# Patient Record
Sex: FEMALE | Race: BLACK OR AFRICAN AMERICAN | Employment: UNEMPLOYED | URBAN - METROPOLITAN AREA
[De-identification: names, ages, dates, MRNs, and addresses within clinical notes are randomized per-mention and may not be internally consistent; named-entity substitution may affect disease eponyms.]

---

## 2017-02-06 ENCOUNTER — HOSPITAL ENCOUNTER (EMERGENCY)
Age: 47
Discharge: HOME | End: 2017-02-06
Attending: EMERGENCY MEDICINE
Payer: SELF-PAY

## 2017-02-06 VITALS
TEMPERATURE: 98 F | RESPIRATION RATE: 16 BRPM | DIASTOLIC BLOOD PRESSURE: 97 MMHG | BODY MASS INDEX: 37.69 KG/M2 | SYSTOLIC BLOOD PRESSURE: 145 MMHG | WEIGHT: 192 LBS | HEART RATE: 77 BPM | OXYGEN SATURATION: 97 % | HEIGHT: 60 IN

## 2017-02-06 DIAGNOSIS — G62.9 NEUROPATHY: Primary | ICD-10-CM

## 2017-02-06 PROCEDURE — 99282 EMERGENCY DEPT VISIT SF MDM: CPT

## 2017-02-06 RX ORDER — GABAPENTIN 300 MG/1
300 CAPSULE ORAL 3 TIMES DAILY
Qty: 90 CAP | Refills: 0 | Status: SHIPPED | OUTPATIENT
Start: 2017-02-06 | End: 2021-04-19 | Stop reason: ALTCHOICE

## 2017-02-06 NOTE — ED PROVIDER NOTES
HPI Comments: 11:56 AM Lida Mgaallanes is a 48 y.o. female w/ hx of HA, and anemia who presents to the ED c/o L greater toe pain for 3 days. Pt states that the pain is radiating into her L calf. Pt has been soaking her toe in warm water and Epsom salt w/ no relief. Pt also c/o L arm pain. Pt is currently taking Ultram. Pt has a hx of tubal ligation, , gastric bypass. Pt has no other sx or complaints. The history is provided by the patient. Past Medical History:   Diagnosis Date    Anemia     Chronic pain     Osteoporosis        Past Surgical History:   Procedure Laterality Date    Hx gastric bypass      Hx hernia repair      Hx tubal ligation      Hx  section           Family History:   Problem Relation Age of Onset    Hypertension Mother     Heart Disease Sister 27     CHF       Social History     Social History    Marital status: SINGLE     Spouse name: N/A    Number of children: N/A    Years of education: N/A     Occupational History    Not on file. Social History Main Topics    Smoking status: Never Smoker    Smokeless tobacco: Not on file    Alcohol use Yes    Drug use: No    Sexual activity: Not on file     Other Topics Concern    Not on file     Social History Narrative         ALLERGIES: Review of patient's allergies indicates no known allergies. Review of Systems   Constitutional: Negative for chills, diaphoresis, fatigue, fever and unexpected weight change. HENT: Negative for congestion, dental problem, ear discharge, ear pain, hearing loss, nosebleeds, postnasal drip, sinus pressure, sore throat, trouble swallowing and voice change. Eyes: Negative for photophobia, pain, discharge, redness and visual disturbance. Respiratory: Negative for cough, chest tightness, shortness of breath, wheezing and stridor. Cardiovascular: Negative for chest pain, palpitations and leg swelling.    Gastrointestinal: Negative for abdominal distention, abdominal pain, anal bleeding, blood in stool, constipation, diarrhea, nausea and vomiting. Genitourinary: Negative for difficulty urinating, dyspareunia, dysuria, flank pain, frequency, genital sores, hematuria, menstrual problem, pelvic pain, urgency, vaginal bleeding, vaginal discharge and vaginal pain. Musculoskeletal: Positive for arthralgias (L great toe; L arm; L calf) and myalgias (L great toe; L arm; L calf). Negative for back pain, joint swelling, neck pain and neck stiffness. Skin: Negative for color change, rash and wound. Neurological: Negative for dizziness, tremors, seizures, syncope, weakness, light-headedness, numbness and headaches. Hematological: Negative for adenopathy. Does not bruise/bleed easily. Psychiatric/Behavioral: Negative for agitation, confusion, decreased concentration, hallucinations, sleep disturbance and suicidal ideas. The patient is not nervous/anxious. All other systems reviewed and are negative. Vitals:    02/06/17 1154   BP: (!) 145/97   Pulse: 77   Resp: 16   Temp: 98 °F (36.7 °C)   SpO2: 97%   Weight: 87.1 kg (192 lb)   Height: 5' (1.524 m)            Physical Exam   Constitutional: She is oriented to person, place, and time. She appears well-developed and well-nourished. No distress. HENT:   Head: Normocephalic and atraumatic. Right Ear: External ear normal.   Left Ear: External ear normal.   Nose: Nose normal.   Mouth/Throat: Oropharynx is clear and moist. No oropharyngeal exudate. Eyes: Conjunctivae and EOM are normal. Pupils are equal, round, and reactive to light. Right eye exhibits no discharge. Left eye exhibits no discharge. No scleral icterus. Neck: Normal range of motion. Neck supple. No JVD present. No tracheal deviation present. No thyromegaly present. Cardiovascular: Normal rate, regular rhythm, normal heart sounds and intact distal pulses. Exam reveals no gallop and no friction rub. No murmur heard.   Pulmonary/Chest: Effort normal and breath sounds normal. No stridor. No respiratory distress. She has no wheezes. She has no rales. She exhibits no tenderness. Abdominal: Soft. Bowel sounds are normal. She exhibits no distension and no mass. There is no tenderness. There is no rebound and no guarding. Musculoskeletal: Normal range of motion. She exhibits no edema or tenderness. Lymphadenopathy:     She has no cervical adenopathy. Neurological: She is alert and oriented to person, place, and time. She has normal reflexes. No cranial nerve deficit. Skin: Skin is warm and dry. No rash noted. She is not diaphoretic. No erythema. No pallor. Psychiatric: She has a normal mood and affect. Her behavior is normal. Judgment normal.   Nursing note and vitals reviewed. MDM  Number of Diagnoses or Management Options  Elevated blood pressure:   Neuropathy:   Risk of Complications, Morbidity, and/or Mortality  Presenting problems: low  Diagnostic procedures: minimal  Management options: low      ED Course       Procedures        Vitals:  Patient Vitals for the past 12 hrs:   Temp Pulse Resp BP SpO2   02/06/17 1154 98 °F (36.7 °C) 77 16 (!) 145/97 97 %       Medications ordered:   Medications - No data to display      Lab findings:    Progress notes, Consult notes or additional Procedure notes:       Disposition:  Diagnosis:   1. Neuropathy    2. Elevated blood pressure        Disposition: Discharge    Follow-up Information     Follow up With Details Comments [facility] Schedule an appointment as soon as possible for a visit in 1 day for re-evaluation and further treatment [address] EMERGENCY DEPT  As needed, If symptoms worsen [address] [facility] 76.  [phone]           Patient's Medications   Start Taking    GABAPENTIN (NEURONTIN) 300 MG CAPSULE    Take 1 Cap by mouth three (3) times daily.    Continue Taking    BUTALBITAL-ACETAMINOPHEN-CAFFEINE (FIORICET, ESGIC) -40 MG PER TABLET    Take 1 Tab by mouth. CHOLECALCIFEROL, VITAMIN D3, (VITAMIN D3) 2,000 UNIT TAB    Take  by mouth. TRAMADOL (ULTRAM) 50 MG TABLET    Take 1 Tab by mouth every six (6) hours as needed for Pain. Max Daily Amount: 200 mg. These Medications have changed    No medications on file   Stop Taking    FERROUS SULFATE 324 MG (65 MG IRON) TABLET    Take  by mouth Daily (before breakfast). Scribe Attestation:   I, Edilberto Gaytan am scribing for and in the presence of Ruy Martin DO on this day 02/06/17 at 11:56 AM   vanessa Holden    Provider Attestation:  I personally performed the services described in the documentation, reviewed the documentation, as recorded by the scribe in my presence, and it accurately and completely records my words and actions.   Ruy Martin DO. 11:56 AM      Signed by: Vanessa Holden, 11:56 AM

## 2017-02-06 NOTE — ED TRIAGE NOTES
\"I've had pain and swelling in my left big toe for two days. Today the pain was going up my leg and my arm and hand are hurting too. \"

## 2017-02-06 NOTE — DISCHARGE INSTRUCTIONS
Acute High Blood Pressure: Care Instructions  Your Care Instructions  Acute high blood pressure is very high blood pressure. It's a serious problem. Very high blood pressure can damage your brain, heart, eyes, and kidneys. You may have been given medicines to lower your blood pressure. You may have gotten them as pills or through a needle in one of your veins. This is called an IV. And maybe you were given other medicines too. These can be needed when high blood pressure causes other problems. To keep your blood pressure at a lower level, you may need to make healthy lifestyle changes. And you will probably need to take medicines. Be sure to follow up with your doctor about your blood pressure and what you can do about it. Follow-up care is a key part of your treatment and safety. Be sure to make and go to all appointments, and call your doctor if you are having problems. It's also a good idea to know your test results and keep a list of the medicines you take. How can you care for yourself at home? · See your doctor as often as he or she recommends. This is to make sure your blood pressure is under control. You will probably go at least 2 times a year. But it may be more often at first.  · Take your blood pressure medicine exactly as prescribed. You may take one or more types. They include diuretics, beta-blockers, ACE inhibitors, calcium channel blockers, and angiotensin II receptor blockers. Call your doctor if you think you are having a problem with your medicine. · If you take blood pressure medicine, talk to your doctor before you take decongestants or anti-inflammatory medicine, such as ibuprofen. These can raise blood pressure. · Learn how to check your blood pressure at home. Check it often. · Ask your doctor if it's okay to drink alcohol. · Talk to your doctor about lifestyle changes that can help blood pressure. These include being active and not smoking.   When should you call for help?  Call 911 anytime you think you may need emergency care. This may mean having symptoms that suggest that your blood pressure is causing a serious heart or blood vessel problem. Your blood pressure may be over 180/110. For example, call 911 if:  · You have symptoms of a heart attack. These may include:  ¨ Chest pain or pressure, or a strange feeling in the chest.  ¨ Sweating. ¨ Shortness of breath. ¨ Nausea or vomiting. ¨ Pain, pressure, or a strange feeling in the back, neck, jaw, or upper belly or in one or both shoulders or arms. ¨ Lightheadedness or sudden weakness. ¨ A fast or irregular heartbeat. · You have symptoms of a stroke. These may include:  ¨ Sudden numbness, tingling, weakness, or loss of movement in your face, arm, or leg, especially on only one side of your body. ¨ Sudden vision changes. ¨ Sudden trouble speaking. ¨ Sudden confusion or trouble understanding simple statements. ¨ Sudden problems with walking or balance. ¨ A sudden, severe headache that is different from past headaches. · You have severe back or belly pain. Do not wait until your blood pressure comes down on its own. Get help right away. Call your doctor now or seek immediate care if:  · Your blood pressure is much higher than normal (such as 180/110 or higher), but you don't have symptoms. · You think high blood pressure is causing symptoms, such as:  ¨ Severe headache. ¨ Blurry vision. Watch closely for changes in your health, and be sure to contact your doctor if:  · Your blood pressure measures 140/90 or higher at least 2 times. That means the top number is 140 or higher or the bottom number is 90 or higher, or both. · You think you may be having side effects from your blood pressure medicine. · Your blood pressure is usually normal, but it goes above normal at least 2 times. Where can you learn more? Go to http://bennie-lisa.info/.   Enter T562 in the search box to learn more about \"Acute High Blood Pressure: Care Instructions. \"  Current as of: August 8, 2016  Content Version: 11.1  © 6979-0155 [employer]. Care instructions adapted under license by [employer] (which disclaims liability or warranty for this information). If you have questions about a medical condition or this instruction, always ask your healthcare professional. [address] any warranty or liability for your use of this information. Neuropathic Pain: Care Instructions  Your Care Instructions  Neuropathic pain is caused by pressure on or damage to your nerves. It's often simply called nerve pain. Some people feel this type of pain all the time. For others, it comes and goes. Diabetes, shingles, or an injury can cause nerve pain. Many people say the pain feels sharp, burning, or stabbing. But some people feel it as a dull ache. In some cases, it makes your skin very sensitive. So touch, pressure, and other sensations that did not hurt before may now cause pain. It's important to know that this kind of pain is real and can affect your quality of life. It's also important to know that treatment can help. Treatment includes pain medicines, exercise, and physical therapy. Medicines can help reduce the number of pain signals that travel over the nerves. This can make the painful areas less sensitive. It can also help you sleep better and improve your mood. But medicines are only one part of successful treatment. Most people do best with more than one kind of treatment. Your doctor may recommend that you try cognitive-behavioral therapy and stress management. Or, if needed, you may decide to try to quit smoking, lower your blood pressure, or better control blood sugar. These kinds of healthy changes can also make a difference. If you feel that your treatment is not working, talk to your doctor. And be sure to tell your doctor if you think you might be depressed or anxious. These are common problems that can also be treated. Follow-up care is a key part of your treatment and safety. Be sure to make and go to all appointments, and call your doctor if you are having problems. It's also a good idea to know your test results and keep a list of the medicines you take. How can you care for yourself at home? · Be safe with medicines. Read and follow all instructions on the label. ¨ If the doctor gave you a prescription medicine for pain, take it as prescribed. ¨ If you are not taking a prescription pain medicine, ask your doctor if you can take an over-the-counter medicine. · Save hard tasks for days when you have less pain. Follow a hard task with an easy task. And remember to take breaks. · Relax, and reduce stress. You may want to try deep breathing or meditation. These can help. · Keep moving. Gentle, daily exercise can help reduce pain. Your doctor or physical therapist can tell you what type of exercise is best for you. This may include walking, swimming, and stationary biking. It may also include stretches and range-of-motion exercises. · Try heat, cold packs, and massage. · Get enough sleep. Constant pain can make you more tired. If the pain makes it hard to sleep, talk with your doctor. · Think positively. Your thoughts can affect your pain. Do fun things to distract yourself from the pain. See a movie, read a book, listen to music, or spend time with a friend. · Keep a pain diary. Try to write down how strong your pain is and what it feels like. Also try to notice and write down how your moods, thoughts, sleep, activities, and medicine affect your pain. These notes can help you and your doctor find the best ways to treat your pain. Reducing constipation caused by pain medicine  Pain medicines often cause constipation. To reduce constipation:  · Include fruits, vegetables, beans, and whole grains in your diet each day. These foods are high in fiber.   · Drink plenty of fluids, enough so that your urine is light yellow or clear like water. If you have kidney, heart, or liver disease and have to limit fluids, talk with your doctor before you increase the amount of fluids you drink. · Get some exercise every day. Build up slowly to 30 to 60 minutes a day on 5 or more days of the week. · Take a fiber supplement, such as Citrucel or Metamucil, every day if needed. Read and follow all instructions on the label. · Schedule time each day for a bowel movement. Having a daily routine may help. Take your time and do not strain when having a bowel movement. · Ask your doctor about a laxative. The goal is to have one easy bowel movement every 1 to 2 days. Do not let constipation go untreated for more than 3 days. When should you call for help? Call your doctor now or seek immediate medical care if:  · You feel sad, anxious, or hopeless for more than a few days. This could mean you are depressed. Depression is common in people who have a lot of pain. But it can be treated. · You have trouble with bowel movements, such as:  ¨ No bowel movement in 3 days. ¨ Blood in the anal area, in your stool, or on the toilet paper. ¨ Diarrhea for more than 24 hours. Watch closely for changes in your health, and be sure to contact your doctor if:  · Your pain is getting worse. · You can't sleep because of pain. · You are very worried or anxious about your pain. · You have trouble taking your pain medicine. · You have any concerns about your pain medicine or its side effects. · You have vomiting or cramps for more than 2 hours. Where can you learn more? Go to http://bennie-lisa.info/. Enter O360 in the search box to learn more about \"Neuropathic Pain: Care Instructions. \"  Current as of: February 19, 2016  Content Version: 11.1  © 5962-6757 [employer].  Care instructions adapted under license by [employer] (which disclaims liability or warranty for this information). If you have questions about a medical condition or this instruction, always ask your healthcare professional. [place] [address] any warranty or liability for your use of this information.

## 2017-02-06 NOTE — ED NOTES
I have reviewed discharge instructions with the patient. The spouse verbalized understanding. Patient armband removed and shredded.

## 2020-09-14 ENCOUNTER — OFFICE VISIT (OUTPATIENT)
Dept: ORTHOPEDIC SURGERY | Age: 50
End: 2020-09-14

## 2020-09-14 VITALS
HEIGHT: 60 IN | HEART RATE: 91 BPM | SYSTOLIC BLOOD PRESSURE: 105 MMHG | TEMPERATURE: 98.2 F | WEIGHT: 258 LBS | RESPIRATION RATE: 15 BRPM | BODY MASS INDEX: 50.65 KG/M2 | DIASTOLIC BLOOD PRESSURE: 74 MMHG

## 2020-09-14 DIAGNOSIS — E66.01 OBESITY, MORBID (HCC): ICD-10-CM

## 2020-09-14 DIAGNOSIS — S83.242A ACUTE MEDIAL MENISCAL TEAR, LEFT, INITIAL ENCOUNTER: Primary | ICD-10-CM

## 2020-09-14 DIAGNOSIS — M17.12 PRIMARY OSTEOARTHRITIS OF LEFT KNEE: ICD-10-CM

## 2020-09-14 DIAGNOSIS — M22.2X2 PATELLOFEMORAL DISORDER, LEFT: ICD-10-CM

## 2020-09-14 RX ORDER — ATORVASTATIN CALCIUM 40 MG/1
TABLET, FILM COATED ORAL
COMMUNITY
Start: 2020-08-15 | End: 2021-04-19 | Stop reason: ALTCHOICE

## 2020-09-14 RX ORDER — DICLOFENAC SODIUM 50 MG/1
50 TABLET, DELAYED RELEASE ORAL 2 TIMES DAILY WITH MEALS
Qty: 180 TAB | Refills: 0 | Status: SHIPPED | OUTPATIENT
Start: 2020-09-14 | End: 2021-04-19 | Stop reason: ALTCHOICE

## 2020-09-14 RX ORDER — LANOLIN ALCOHOL/MO/W.PET/CERES
CREAM (GRAM) TOPICAL
COMMUNITY
Start: 2020-08-27

## 2020-09-14 RX ORDER — MELATONIN 5 MG
5 CAPSULE ORAL
COMMUNITY

## 2020-09-14 RX ORDER — ALBUTEROL SULFATE 90 UG/1
1-2 AEROSOL, METERED RESPIRATORY (INHALATION)
COMMUNITY
Start: 2020-01-25

## 2020-09-14 RX ORDER — HYDROCHLOROTHIAZIDE 25 MG/1
TABLET ORAL
COMMUNITY
Start: 2020-08-03 | End: 2021-04-19 | Stop reason: ALTCHOICE

## 2020-09-14 NOTE — PATIENT INSTRUCTIONS
Follow-up on referral to physical therapy, use medication as prescribed, and return to the office in about 6 weeks.

## 2020-09-14 NOTE — PROGRESS NOTES
HISTORY OF PRESENT ILLNESS    Orval Boaz 1970 is a 44y.o. year old female comes in today as new patient for: left knee pain    Patients symptoms have been present for a few months but off/on for years. Pain level 8/10 anterior. It has worsened with walking/stairs. Patient has tried:  Stretches, heat, gabapentin with benefit but gabapentin makes her feel weird. It is described as pain knee w/ swell prior. No known injury. Was given cane prior. Did have injection a few years ago with benefit. IMAGING: MRI left knee 2020  1.  Moderately advanced tricompartmental osteoarthrosis with high-grade chondrosis as discussed. Moderate knee joint effusion. 2. Complex medial meniscus body and posterior horn tears with extrusion of the body. 3. Mild nonspecific prepatellar tendon superficial edema, correlate for bursitis. 4. Mild semimembranosus bursitis. Past Surgical History:   Procedure Laterality Date    HX  SECTION      HX GASTRIC BYPASS      HX HERNIA REPAIR      Hernia repair hiatal  ,,    HX OTHER SURGICAL      bladder stent    HX TUBAL LIGATION      HX TUBAL LIGATION      HX UROLOGICAL  2010    Ureteral stent placement    ID CYSTOURETHROSCOPY  2020    CYSTOSCOPY, WITH RETROGRADE PYELOGRAM AND URETERAL STENT INSERTION; Surgeon: Maldonado Nguyen MD, [facility]     Social History     Socioeconomic History    Marital status: UNKNOWN     Spouse name: Not on file    Number of children: Not on file    Years of education: Not on file    Highest education level: Not on file   Tobacco Use    Smoking status: Never Smoker    Smokeless tobacco: Never Used   Substance and Sexual Activity    Alcohol use: Yes     Comment: rarely    Drug use: Yes     Types: Marijuana      Current Outpatient Medications   Medication Sig Dispense Refill    albuterol (PROVENTIL HFA, VENTOLIN HFA, PROAIR HFA) 90 mcg/actuation inhaler Take 1-2 Puffs by inhalation every four (4) hours as needed.  atorvastatin (LIPITOR) 40 mg tablet take 1 tablet by mouth at bedtime      ferrous sulfate 325 mg (65 mg iron) tablet take 1 tablet by mouth twice a day      hydroCHLOROthiazide (HYDRODIURIL) 25 mg tablet take 1 tablet by mouth once daily      tamsulosin (FLOMAX) 0.4 mg capsule take 1 capsule by mouth once daily after SUPPER      melatonin 5 mg cap capsule Take 5 mg by mouth nightly.  gabapentin (NEURONTIN) 300 mg capsule Take 1 Cap by mouth three (3) times daily. (Patient taking differently: Take 100 mg by mouth three (3) times daily.) 90 Cap 0    butalbital-acetaminophen-caffeine (FIORICET, ESGIC) -40 mg per tablet Take 1 Tab by mouth.  cholecalciferol, vitamin D3, (VITAMIN D3) 2,000 unit tab Take  by mouth.  traMADol (ULTRAM) 50 mg tablet Take 1 Tab by mouth every six (6) hours as needed for Pain. Max Daily Amount: 200 mg. 15 Tab 0     Past Medical History:   Diagnosis Date    Anemia     Chronic pain     High blood pressure     Osteoporosis     Pyelonephritis     Stroke ([facility] [address]     Ureteral calculi      Family History   Problem Relation Age of Onset    Hypertension Mother     Heart Disease Sister 27        CHF         ROS:  + buckle. All other systems reviewed and negative aside from that written in the HPI. Objective:  /74   Pulse 91   Temp 98.2 °F (36.8 °C)   Resp 15   Ht 5' (1.524 m)   Wt 258 lb (117 kg)   BMI 50.39 kg/m²   GEN: Appears stated age in NAD. HEAD:  Normocephalic, atraumatic. NEURO:  Sensation intact light touch B/L lower extremities. MS:  LE Strength +5/5 bilateral .   left Knee:  1+ Effusion . Lachman negative. Valgus negative. Varus negative. positive joint line tenderness medial.  Shasta positive. Posterior drawer negative. Noble compression test negative. Patellar apprehension negative.   Patellar facet  positive tender to palpation medial + crepitance bilateral .  Pes anserine negative TTP bilateral   EXT: no clubbing/cyanosis. no edema. SKIN: Warm/dry without rash. HEENT: Conjunctiva/lids WNL. External canals/nares WNL. Tongue midline. PERRL, EOMI. Hearing intact. NECK: Trachea midline. Supple, Full ROM. No thyromegaly. CARDIAC: No edema. LUNGS: Normal effort. ABD: Soft, no masses. No HSM. PSYCH: A+O x3. Appropriate judgment and insight. Assessment/Plan:     ICD-10-CM ICD-9-CM    1. Acute medial meniscal tear, left, initial encounter  S83.242A 836.0 diclofenac EC (VOLTAREN) 50 mg EC tablet      REFERRAL TO PHYSICAL THERAPY   2. Obesity, morbid (HCC)  E66.01 278.01    3. Patellofemoral disorder, left  M22.2X2 719.96 diclofenac EC (VOLTAREN) 50 mg EC tablet      REFERRAL TO PHYSICAL THERAPY   4. Primary osteoarthritis of left knee  M17.12 715.16 diclofenac EC (VOLTAREN) 50 mg EC tablet      REFERRAL TO PHYSICAL THERAPY       Patient (or guardian if minor) verbalizes understanding of evaluation and plan. Will start PT w/ voltaren oral BID and RTC 6 weeks. Consider inject/surgeon eval then.

## 2020-09-14 NOTE — PROGRESS NOTES
AVS reviewed: YES  HEP: N/A  Resources Provided: YES AVS & PT order  Patient questions/concerns answered: YES.  Pt states weight was 238#  Patient verbalized understanding of treatment plan: YES

## 2022-03-18 PROBLEM — E66.01 OBESITY, MORBID (HCC): Status: ACTIVE | Noted: 2020-09-14

## 2023-02-10 ENCOUNTER — TELEPHONE (OUTPATIENT)
Age: 53
End: 2023-02-10